# Patient Record
Sex: MALE | Race: WHITE | Employment: FULL TIME | URBAN - METROPOLITAN AREA
[De-identification: names, ages, dates, MRNs, and addresses within clinical notes are randomized per-mention and may not be internally consistent; named-entity substitution may affect disease eponyms.]

---

## 2022-10-10 ENCOUNTER — OFFICE VISIT (OUTPATIENT)
Dept: ORTHOPEDIC SURGERY | Age: 27
End: 2022-10-10
Payer: COMMERCIAL

## 2022-10-10 ENCOUNTER — HOSPITAL ENCOUNTER (OUTPATIENT)
Dept: GENERAL RADIOLOGY | Age: 27
Discharge: HOME | End: 2022-10-12
Payer: COMMERCIAL

## 2022-10-10 DIAGNOSIS — M25.562 ACUTE PAIN OF LEFT KNEE: ICD-10-CM

## 2022-10-10 DIAGNOSIS — M76.32 IT BAND SYNDROME, LEFT: Primary | ICD-10-CM

## 2022-10-10 DIAGNOSIS — M25.562 LEFT KNEE PAIN, UNSPECIFIED CHRONICITY: ICD-10-CM

## 2022-10-10 PROCEDURE — 99203 OFFICE O/P NEW LOW 30 MIN: CPT | Performed by: STUDENT IN AN ORGANIZED HEALTH CARE EDUCATION/TRAINING PROGRAM

## 2022-10-10 PROCEDURE — 73564 X-RAY EXAM KNEE 4 OR MORE: CPT | Performed by: STUDENT IN AN ORGANIZED HEALTH CARE EDUCATION/TRAINING PROGRAM

## 2022-10-10 NOTE — PROGRESS NOTES
Name: Joey Chowdhury  YOB: 1995  Gender: male  MRN: [id number]  Date of Encounter:  10/10/2022       CHIEF COMPLAINT:     Chief Complaint   Patient presents with    Knee Pain        SUBJECTIVE/OBJECTIVE:      HPI:    Patient is a 24 y.o. pleasant male who presents today for a new evaluation of his LEFT knee. Date of injury / symptom onset: around 3 weeks    He is a former , has been running more frequently the past two years. He is currently training for a marathon, expected to run 11/12. This would be his second marathon. Around 3 weeks ago he was on a longer run, expected to run 16 miles, and began to have left lateral knee pain about mile 7. He pushed through to mile 11, stopped for water, then felt sharp pain in the knee and went home. He since has decreased his mileage, has been rolling, stretching more, and he changed his footwear. He ran a trail half marathon this past weekend and didn't have very much pain or flare up of his knee pain. He denies swelling, instability, or painful mechanical symptoms. PAST HISTORY:   Past medical, surgical, family, social history and allergies reviewed by me. Pertinent history:   Tobacco use:  has no history on file for tobacco use. Diabetes: none  CKD: no  Anticoagulation: no      REVIEW OF SYSTEMS:   As noted in HPI. PHYSICAL EXAMINATION:     Gen: Well-developed, no acute distress   HEENT: NC/AT, EOMI   Neck: Trachea midline, normal ROM   CV: Regular rhythm by palpation of distal pulse, normal capillary refill   Pulm: No respiratory distress, no stridor   Psychiatric: Well oriented, normal mood and affect. Skin: No rashes, lesions or ulcers, normal temperature, turgor, and texture on uninvolved extremity.       ORTHO EXAM:     Left KNEE:     Alignment: normal  Inspection: No deformity, No edema, No ecchymosis  Palpation: Effusion  none; Crepitus Negative, Patellar mobility normal  ROM: 140 flexion, 0 extension   Tenderness: Distal IT band. No tenderness at [facility] tubercle. Provocative testing: (-) Reji lateral, Reji medial , Anterior drawer, Posterior drawer, Valgus stress laxity at 0, 30, degrees, Varus stress laxity at 0, 30, degrees. [place] test causes some pain / pulling at proximal IT band. Strength: Extensor mechanism intact  Sensation: intact to light touch   Capillary refill normal    Gait: Normal. Symmetrical gait / light jog. Neutral foot. DIAGNOSTIC IMAGING:     X-ray LEFT knee 4 vw AP / lateral / Areatha Marts / sunrise for knee pain    Findings: No soft tissue swelling. No effusion noted. No acute fracture or dislocation. No degenerative changes are present. Impression: Normal 4 view of knee. I independently interpreted XR taken today    ASSESSMENT/PLAN:   1. It band syndrome, left    2. Acute pain of left knee       Patient exhibits pain over proximal and distal IT band. No instability, no effusion on exam, normal XR. He has no focal bony tenderness suggestive of stress injury. Referral to PT placed to assist patient in rehabilitation in hopes that he may be able to advance his activity and safely run his marathon. He was advised to slowly advance mileage in conjunction with therapy, but use pain as a guide. NSAIDs, icing PRN advised. Follow up in 4 weeks. Orders / medications today:   Orders Placed This Encounter   Procedures    XR KNEE LEFT (MIN 4 VIEWS)     Standing: AP, Lateral, Sunrise, and Skiers     Standing Status:   Future     Number of Occurrences:   1     Standing Expiration Date:   10/10/2023    Ambulatory referral to Physical Therapy     Referral Priority:   Routine     Referral Type:   Eval and Treat     Referral Reason:   Patient Preference     Referred to Provider:   Ralf Randhawa PT     Number of Visits Requested:   1      Follow up: Return in about 4 weeks (around 11/7/2022). The patient expressed understanding and agreed with the plan.      Kye Redd MD   Orthopaedics and [address] [facility],Third Floor     This document was created using voice recognition software so frequent mistakes are possible. For any concerns about the wording of this document, please contact its creator for further clarification.

## 2022-10-12 ENCOUNTER — OFFICE VISIT (OUTPATIENT)
Dept: ORTHOPEDIC SURGERY | Age: 27
End: 2022-10-12
Payer: COMMERCIAL

## 2022-10-12 DIAGNOSIS — M25.562 ACUTE PAIN OF LEFT KNEE: Primary | ICD-10-CM

## 2022-10-12 DIAGNOSIS — Z78.9 ALTERATION IN PERFORMANCE OF ACTIVITIES OF DAILY LIVING: ICD-10-CM

## 2022-10-12 PROCEDURE — 97140 MANUAL THERAPY 1/> REGIONS: CPT | Performed by: PHYSICAL THERAPIST

## 2022-10-12 PROCEDURE — 97110 THERAPEUTIC EXERCISES: CPT | Performed by: PHYSICAL THERAPIST

## 2022-10-12 PROCEDURE — 97162 PT EVAL MOD COMPLEX 30 MIN: CPT | Performed by: PHYSICAL THERAPIST

## 2022-10-12 PROCEDURE — 20560 NDL INSJ W/O NJX 1 OR 2 MUSC: CPT | Performed by: PHYSICAL THERAPIST

## 2022-10-12 NOTE — PROGRESS NOTES
[address] [facility]  [address] [id number]  Dept: [phone]      Physical Therapy Initial Assessment     Referring MD: Zahira Varela MD  Diagnosis:     ICD-10-CM    1. Acute pain of left knee  M25.562       2. Alteration in performance of activities of daily living  Z78.9          Therapy precautions:None  Co-morbidities affecting plan of care: PMH significant for right patellar tendinitis in high school    Total Timed Procedure Codes: 25 min, Total Time: 60 min  Time In: 08:05 AM  Time Out: 09:05 AM    PERTINENT MEDICAL HISTORY     Past medical and surgical history:   No past medical history on file. No past surgical history on file. Medications: reviewed in chart   Allergies: Not on File     SUBJECTIVE     Chief complaints/history of injury: Patient is a 24 y.o. male with a PMH complicated as noted above. He presents to PT with c/o left knee pain. Date symptoms began: ~3-4 weeks ago  [place]oper [place] of condition: Recent onset (initial onset within last 3 months)  Primary cause of current episode: Repetitive  How did symptoms start: Patient is a retired  who has transitioned into distance running. He is currently training for marathon and has run one in the past.  States about 3-4 weeks ago he was doing his long run and felt tightness in his lateral left knee that progressed to sharp burning pain at mile 7. States he continued to run and stopped at mile 11 to refill his water bottle. States he we restarted his run he felt stabbing pain and discontinued the run. States he rested for about 5 days however continued to struggle once he resumed running. States he bought new running shoes a specialty shoe store and ran a 5K without issues. Describe current symptoms: He ran a trial 1/2 marathon with intermittent moderate pain when going downhill, and he ran 6 miles 3 days ago and it felt \"okay\".   States he generally feels pain at around mile 3 and the pain lingers for about an hour after cessation of run. Reports no pain with general MRALDs. Received previous therapy? No    Diagnostic exams (per chart review): per 10/10/2022 MD note  X-ray LEFT knee 4 vw AP / lateral / Amena Said / sunrise for knee pain     Findings: No soft tissue swelling. No effusion noted. No acute fracture or dislocation. No degenerative changes are present. Impression: Normal 4 view of knee. Pain Assessment:    Pain Characteristics   Intensity: 0-7/10   Location: Lateral left knee   Description: Tight   How often do you feel symptoms: Intermittently (0-25%)   Aggravating factors: Jogging, uphill and downhill jogging   Alleviating factors: Changed footwear, Advil,        Neuro screen: denies numbness, tingling, and radiating pain    Social/Functional Hx: How would you rate your overall health? very good  Pt lives with significant other in a(n) 1 story house with entry steps. Current DME: none  Work Status: Employed full time: [employer]   Sleep: normal  PLOF & Social Hx/Interests: Independent and active without physical limitations, Independent community ambulator, Independent household ambulator, Independent with all ADLs, drove independently, and participated in training for marathon  Current level of function:  Independent    Patient Stated Goals:   Train effectively for marathon  Run marathon      OBJECTIVE EXAMINATION     Functional Outcome Questionnaire: Lower Extremity Functional Scale: 62/80 = 78% Function   Observation:   Posture:  Foot Pronation bilaterally, however controlled with inserts  Swelling/Edema: none  Skin Integrity: normal   Palpation:   Gluteus minimus - Palpable tenderness elicited  Biceps Femoris - No palpable tenderness  Semitendinosus - Palpable tenderness elicited distally  Vastus Lateralis - Palpable tenderness elicited in sidelying distally    Patella Mobility: No hypomobility or hypermobility       A/PROM: RIGHT LEFT  Quality of Movement   Knee: WNL WNL    Ankle DF: WFL WFL    HIP: [facility] WF         MMT: RIGHT LEFT Quality of Testing   HIP FLEX: 5/5 5/5    HIP ABD: 5/5 5/5    HIP EXT: 4+/5 4+/5    KNEE EXT: 5/5 4+/5    KNEE FLEX: 5/5 4+/5    DF: 5/5 5/5    PF: 5/5 5/5      Special Tests/Function:   Gait:   Assistive Device None   Initial Contact Heel Strike   Mid-stance Pronated   Terminal Stance Early Heel Off   Swing Phase Passes stance leg   Gait Speed WNL   Quality Non-antalgic      Stair Management:  Ascending  No HR and Reciprocal   Descending  No HR and Reciprocal      Squat Assessment:  Depth 50%   Dynamic Knee Valgus Present   Weight Shift Excessive Right and Excessive Anterior   Trunk Alignment Upright   Presence of Heel Raise Not present            Treatment provided today consisted of initial evaluation followed by: Therapeutic exercise (58659) x 15 min to address ROM/strength deficits and to develop an initial HEP as noted below. Access Code: [email or web address]  URL: [email or web address]  Date: 10/12/2022  Prepared by: Rosaura Dalton    Exercises  Prone Quadriceps Stretch with Strap - 2 x daily - 7 x weekly - 3 sets - 30 seconds hold  Hooklying Hamstring Stretch with Strap - 2 x daily - 7 x weekly - 3 sets - 30 seconds hold  Supine ITB Stretch with Strap - 2 x daily - 7 x weekly - 3 sets - 30 seconds hold  Hip Adductors and Hamstring Stretch with Strap - 2 x daily - 7 x weekly - 3 sets - 30 seconds hold  Hamstring Mobilization with Foam Roll - 2 x daily - 7 x weekly      Patient Education on the condition/pathology, involved anatomy, exercise rationale, cryotherapy, and thermotherapy. Patient was issued a written copy of his HEP which he demonstrated with good form and technique. Tactile and verbal cuing provided to facilitate proper form and technique.      Dry Needling (un-timed code) 19429: needle insertion(s) without injection(s); 1 or 2 muscle(s): Stimulating underlying myofascial trigger points, muscular and connective tissues for the management of neuromusculoskeletal pain and movement impairment   Patient was educated on dry needling treatment, expectations, and post-treatment instructions. Dry needling precautions/contraindications: Reviewed- none noted    Needles size and location: 50 mm  Right vastus lateralis and semitendinosus                                             Needle count: 8 needles inserted/ 8 needles removed   Position: Prone and left sidelying   Needle  technique left in situ x 5 minutes    Electrical Stimulation Not performed   Patient Response: Patient experienced no adverse response to treatment. 4.   Manual therapy (81422) x 10 min utilizing techniques to improve joint and/or soft tissue mobility, ROM, and function as well as helping to decrease pain/spasms and swelling. Palpation and assessment of soft tissue, muscles, and landmarks   STM/IASTM to right semitendinosus, ITB, and vastus lateralis      Plan for Next Session: Assess patient's tolerance to treatment and  assess Modesto's, Darlene Needy, and CHRISTINA tests  and work on hip mobility exercises if indicated. Also continue dry needling and add gluteus minimus treatment if indicated. CLINICAL DECISION MAKING/ASSESSMENT     Personal Factors/co-morbidities affecting POC (1-2 Medium/3+High): no factors involved   Problem List: (1-2 Low/ 3 Medium/ 4+ High) Pain  Soft tissue restrictions  Decreased endurance/activity Tolerance  Restricted recreational participation    Clinical decision making: moderate complexity with questionable prediction of expectations and future outcomes which may require adjustments to the POC. Prognosis: good   Benefits and precautions of treatment explained to patient. Judy Chaves is a 24 y.o. male who presents to therapy today with evolving/changing clinical presentation (moderate complexity)  related to ITB syndrome.   Pt would benefit from skilled physical therapy services to address the deficits noted above for return to prior level of function. PLAN OF CARE     Effective Dates: 10/12/2022 TO 11/11/2022 (30 days). Frequency/Duration: 2x/week for 30 Day(s)  Interventions may include but are not limited to: (86041) Therapeutic exercise to develop ROM, strength, endurance and flexibility  (59934) Therapeutic activities using dynamic activities to improve function  (68389) Manual therapy techniques to improve joint and/or soft tissue mobility, ROM, and function as well as helping to decrease pain/spasms and swelling  (62622) Neuromuscular reeducation addressing impaired balance, coordination, kinesthetic sense, posture and proprioception  (16621/69510) Dry needling for the management of neuromusculoskeletal pain and movement impairment  Home exercise program (HEP) development    The referring physician has reviewed and approved this evaluation and plan of care as noted by the electronic signature attached to note. GOALS     Short term goals to be met by 10/26/2022 (2 weeks)  Patient will demonstrate good recall of HEP requiring minimal verbal cuing for proper form and technique. Pt. will be able to modify activities in order keep pain to minimal levels (? 4/10) with ADLs. Patient will achieve an average score on the Patient-Specific Functional Scale ? 5/10 indicating improving functional mobility. Improve LEFS to ? 65/80 showing improved confidence in functional mobility. Long term goals to be met by 11/9/2022  (4 weeks)  Patient will report minimal to no discomfort while jogging. Improve LEFS to ? 70/80, which shows patient having decreased functional deficit related to knee injury. Patient will achieve an average score on the Patient-Specific Functional Scale ? 7/10 indicating improving functional mobility.   Discharged from [facility]

## 2022-10-25 NOTE — PROGRESS NOTES
[address] [facility]  [address] [place] [id number]  Dept: [phone]      Physical Therapy Daily Note     Referring MD: Zoë Rodgers MD  Diagnosis:     ICD-10-CM    1. Alteration in performance of activities of daily living  Z78.9       2. Acute pain of left knee  M25.562          Therapy precautions:None  Co-morbidities affecting plan of care: PMH significant for right patellar tendinitis in high school    Total Timed Procedure Codes: 40 min, Total Time: 50 min  Time In: 08:05 AM  Time Out: 08:55 AM    PERTINENT MEDICAL HISTORY     Past medical and surgical history:   No past medical history on file. No past surgical history on file. Medications: reviewed in chart   Allergies: Not on File       Chief complaints/history of injury: Patient is a 24 y.o. male with a PMH complicated as noted above. He presents to PT with c/o left knee pain. Date symptoms began: ~3-4 weeks ago  Lorenza Yovani of condition: Recent onset (initial onset within last 3 months)  Primary cause of current episode: Repetitive  How did symptoms start: Patient is a retired  who has transitioned into distance running. He is currently training for marathon and has run one in the past.  States about 3-4 weeks ago he was doing his long run and felt tightness in his lateral left knee that progressed to sharp burning pain at mile 7. States he continued to run and stopped at mile 11 to refill his water bottle. States he we restarted his run he felt stabbing pain and discontinued the run. States he rested for about 5 days however continued to struggle once he resumed running. States he bought new running shoes a specialty shoe store and ran a 5K without issues. Describe current symptoms: He ran a trial 1/2 marathon with intermittent moderate pain when going downhill, and he ran 6 miles 3 days ago and it felt \"okay\".   States he generally feels pain at around mile 3 and the pain lingers for about an hour after cessation of run. Reports no pain with general MRALDs. Received previous therapy? No    Diagnostic exams (per chart review): per 10/10/2022 MD note  X-ray LEFT knee 4 vw AP / lateral / Elmer Keita / renate for knee pain     Findings: No soft tissue swelling. No effusion noted. No acute fracture or dislocation. No degenerative changes are present. Impression: Normal 4 view of knee. SUBJECTIVE     Patient reports he ran 2 days following his last therapy session and experienced left knee pain, however when he ran a day later he felt okay. States at his last run was a 5K and he experienced no knee pain while running, however tightness afterwards. Pain Assessment:  Pain location: Lateral left knee    Average Pain/symptom intensity (0-10 scale)  Last 24 hours: 2-3/10  Last week (1-7 days): 5/10    Outcome Measure: The Patient-Specific Functional Scale: Activity: 10/26/2022 Comments   1. Train effectively for marathon 5-6/10    2. Run marathon 6/10    Average score:  5.75/10      Interpretation of Score: The Patient-Specific functional scale is used to quantify activity limitation and measure functional outcome for patients with any orthopaedic condition. Each activity is scored 0-10, 0 representing unable to perform activity and 10 able to perform activity at the same level as before injury or problem. Minimum detectable change is 2 points for average score and 3 points for single activity score. OBJECTIVE      Treatment provided today consisted of:  Dry Needling (un-timed code) 01588: needle insertion(s) without injection(s); 1 or 2 muscle(s): Stimulating underlying myofascial trigger points, muscular and connective tissues for the management of neuromusculoskeletal pain and movement impairment   Patient was educated on dry needling treatment, expectations, and post-treatment instructions.     Dry needling precautions/contraindications: Reviewed- none noted    [place] size and location: 50 mm  Right vastus lateralis                                             Needle count: 3 needles inserted/ 3 needles removed   Position: Supine   Needle  technique left in situ x 5 minutes    Electrical Stimulation Performed with (91511/) unattended electrical stimulation   Patient Response: Patient experienced no adverse response to treatment. Manual therapy (80529) x 10 min utilizing techniques to improve joint and/or soft tissue mobility, ROM, and function as well as helping to decrease pain/spasms and swelling. Palpation and assessment of soft tissue, muscles, and landmarks   IASTM to right semitendinosus, ITB, and vastus lateralis    Therapeutic exercise (83429) x 30 min to develop ROM, strength, endurance and flexibility in the LLE. 3-way strap stretches x 30\"   Standing quadriceps stretch x 30\"  Forward step-up to reverse lunge combo, 12\" step x 10 each side  Forward step-up to unilateral deadlift, 15#, 12\" step x 10 each side  March stabilization with contralateral shoulder press, 15#  Marching into stabilization with contralateral shoulder press, 15#      ASSESSMENT   Patient presented with multiple tender points in his vastus lateralis therefore DN treatment focused on that muscle with the addition of electrical stimulation. Good visible muscle contraction achieved with EDN as well as good erythema achieved with IASTM. Patient was able to initiate therapeutic exercise today with exercises focused on cross-body core stabilization. GOALS     Short term goals to be met by 10/26/2022 (2 weeks)  Patient will demonstrate good recall of HEP requiring minimal verbal cuing for proper form and technique. - MET: 10/26/22    Pt. will be able to modify activities in order keep pain to minimal levels (? 4/10) with ADLs. - PROGRESSING: 10/26/22   Patient will achieve an average score on the Patient-Specific Functional Scale ?  5/10 indicating improving functional mobility.- MET: 10/26/22   Improve LEFS to ? 65/80 showing improved confidence in functional mobility.- Assess at next therapy session: 10/26/22     Long term goals to be met by 11/9/2022  (4 weeks)  Patient will report minimal to no discomfort while jogging. Improve LEFS to ? 70/80, which shows patient having decreased functional deficit related to knee injury. Patient will achieve an average score on the Patient-Specific Functional Scale ? 7/10 indicating improving functional mobility. Discharged from PT. PLAN    Consider adding gluteus minimus to DN treatment, continue cross-body exercises, look at hip mobility exercises (scorpions and iron cross)     Access Code: [email or web address]  URL: [email or web address]  Date: 10/12/2022  Prepared by: Margy Knox.  DPT    Exercises  Prone Quadriceps Stretch with Strap - 2 x daily - 7 x weekly - 3 sets - 30 seconds hold  Hooklying Hamstring Stretch with Strap - 2 x daily - 7 x weekly - 3 sets - 30 seconds hold  Supine ITB Stretch with Strap - 2 x daily - 7 x weekly - 3 sets - 30 seconds hold  Hip Adductors and Hamstring Stretch with Strap - 2 x daily - 7 x weekly - 3 sets - 30 seconds hold  Hamstring Mobilization with Foam Roll - 2 x daily - 7 x weekly       [employer]

## 2022-10-26 ENCOUNTER — OFFICE VISIT (OUTPATIENT)
Dept: ORTHOPEDIC SURGERY | Age: 27
End: 2022-10-26
Payer: COMMERCIAL

## 2022-10-26 DIAGNOSIS — Z78.9 ALTERATION IN PERFORMANCE OF ACTIVITIES OF DAILY LIVING: Primary | ICD-10-CM

## 2022-10-26 DIAGNOSIS — M25.562 ACUTE PAIN OF LEFT KNEE: ICD-10-CM

## 2022-10-26 PROCEDURE — 20560 NDL INSJ W/O NJX 1 OR 2 MUSC: CPT | Performed by: PHYSICAL THERAPIST

## 2022-10-26 PROCEDURE — 97140 MANUAL THERAPY 1/> REGIONS: CPT | Performed by: PHYSICAL THERAPIST

## 2022-10-26 PROCEDURE — 97110 THERAPEUTIC EXERCISES: CPT | Performed by: PHYSICAL THERAPIST

## 2022-10-28 ENCOUNTER — OFFICE VISIT (OUTPATIENT)
Dept: ORTHOPEDIC SURGERY | Age: 27
End: 2022-10-28
Payer: COMMERCIAL

## 2022-10-28 DIAGNOSIS — Z78.9 ALTERATION IN PERFORMANCE OF ACTIVITIES OF DAILY LIVING: Primary | ICD-10-CM

## 2022-10-28 DIAGNOSIS — M25.562 ACUTE PAIN OF LEFT KNEE: ICD-10-CM

## 2022-10-28 PROCEDURE — 97530 THERAPEUTIC ACTIVITIES: CPT | Performed by: PHYSICAL THERAPIST

## 2022-10-28 PROCEDURE — 97110 THERAPEUTIC EXERCISES: CPT | Performed by: PHYSICAL THERAPIST

## 2022-10-28 NOTE — PROGRESS NOTES
[address] [facility]  [address] [place] [id number]  Dept: [phone]      Physical Therapy Daily Note     Referring MD: Jose Morillo MD  Diagnosis:     ICD-10-CM    1. Alteration in performance of activities of daily living  Z78.9       2. Acute pain of left knee  M25.562          Therapy precautions:None  Co-morbidities affecting plan of care: PMH significant for right patellar tendinitis in high school    Total Timed Procedure Codes: 40 min, Total Time: 40 min  Time In: 01:45 PM  Time Out: 02:25 PM    PERTINENT MEDICAL HISTORY     Past medical and surgical history:   No past medical history on file. No past surgical history on file. Medications: reviewed in chart   Allergies: Not on File       Chief complaints/history of injury: Patient is a 24 y.o. male with a PMH complicated as noted above. He presents to PT with c/o left knee pain. Date symptoms began: ~3-4 weeks ago  Татьяна Dueñas of condition: Recent onset (initial onset within last 3 months)  Primary cause of current episode: Repetitive  How did symptoms start: Patient is a retired  who has transitioned into distance running. He is currently training for marathon and has run one in the past.  States about 3-4 weeks ago he was doing his long run and felt tightness in his lateral left knee that progressed to sharp burning pain at mile 7. States he continued to run and stopped at mile 11 to refill his water bottle. States he we restarted his run he felt stabbing pain and discontinued the run. States he rested for about 5 days however continued to struggle once he resumed running. States he bought new running shoes a specialty shoe store and ran a 5K without issues. Describe current symptoms: He ran a trial 1/2 marathon with intermittent moderate pain when going downhill, and he ran 6 miles 3 days ago and it felt \"okay\".   States he generally feels pain at around mile 3 and the pain lingers for about an hour after cessation of run. Reports no pain with general MRALDs. Received previous therapy? No    Diagnostic exams (per chart review): per 10/10/2022 MD note  X-ray LEFT knee 4 vw AP / lateral / Theoplis Hurl / sunrise for knee pain     Findings: No soft tissue swelling. No effusion noted. No acute fracture or dislocation. No degenerative changes are present. Impression: Normal 4 view of knee. SUBJECTIVE     Patient reports he ran 4 miles yesterday without pain and plans to run a marathon tomorrow. States he also felt good following his last therapy session. OBJECTIVE      Treatment provided today consisted of: Therapeutic exercise (91050) x 15 min to develop ROM, strength, endurance and flexibility in the LLE. Elliptical x 8', 20\" sprint, 1'    3-way strap stretches x 30\"   Standing quadriceps stretch x 30\"  Squat mobility series x 10 each    Therapeutic activities (97274) x 25 min using dynamic activities to improve function related to return to distance jogging. Standing toe touches with heels on 1/2 foam x 15  Standing toe touches with forefeet on 1/2 foam x 15  Iron Cross x 15  Scorpions x 15  Forward/Backwards lunges with contralateral overhead press, 10# - 3x5 (bilateral)        ASSESSMENT   No dry needling performed today d/t patient planning to run a marathon tomorrow. Tolerated treatment today without exacerbation of concordant symptoms. GOALS     Short term goals to be met by 10/26/2022 (2 weeks)  . - MET: 10/26/22    Pt. will be able to modify activities in order keep pain to minimal levels (? 4/10) with ADLs. - PROGRESSING: 10/26/22   Patient will achieve an average score on the Patient-Specific Functional Scale ?  5/10 indicating improving functional mobility.- MET: 10/26/22   Improve LEFS to ? 65/80 showing improved confidence in functional mobility.- Assess at next therapy session: 10/26/22     Long term goals to be met by 11/9/2022  (4 weeks)  Patient will report minimal to no discomfort while jogging. Improve LEFS to ? 70/80, which shows patient having decreased functional deficit related to knee injury. Patient will achieve an average score on the Patient-Specific Functional Scale ? 7/10 indicating improving functional mobility. Discharged from PT. PLAN    continue cross-body exercises, look at hip mobility exercises (scorpions and iron cross)     Access Code: [email or web address]  URL: [email or web address]  Date: 10/12/2022  Prepared by: Brianna Culver.  DPT    Exercises  Prone Quadriceps Stretch with Strap - 2 x daily - 7 x weekly - 3 sets - 30 seconds hold  Hooklying Hamstring Stretch with Strap - 2 x daily - 7 x weekly - 3 sets - 30 seconds hold  Supine ITB Stretch with Strap - 2 x daily - 7 x weekly - 3 sets - 30 seconds hold  Hip Adductors and Hamstring Stretch with Strap - 2 x daily - 7 x weekly - 3 sets - 30 seconds hold  Hamstring Mobilization with Foam Roll - 2 x daily - 7 x weekly       [facility]

## 2022-11-02 ENCOUNTER — OFFICE VISIT (OUTPATIENT)
Dept: ORTHOPEDIC SURGERY | Age: 27
End: 2022-11-02
Payer: COMMERCIAL

## 2022-11-02 DIAGNOSIS — M25.562 ACUTE PAIN OF LEFT KNEE: ICD-10-CM

## 2022-11-02 DIAGNOSIS — M76.32 IT BAND SYNDROME, LEFT: ICD-10-CM

## 2022-11-02 DIAGNOSIS — Z78.9 ALTERATION IN PERFORMANCE OF ACTIVITIES OF DAILY LIVING: Primary | ICD-10-CM

## 2022-11-02 PROCEDURE — 20561 NDL INSJ W/O NJX 3+ MUSC: CPT | Performed by: PHYSICAL THERAPIST

## 2022-11-02 PROCEDURE — 97140 MANUAL THERAPY 1/> REGIONS: CPT | Performed by: PHYSICAL THERAPIST

## 2022-11-02 PROCEDURE — 97110 THERAPEUTIC EXERCISES: CPT | Performed by: PHYSICAL THERAPIST

## 2022-11-02 NOTE — PROGRESS NOTES
[address] [facility]  [address] [place] [id number]  Dept: [phone]      Physical Therapy Daily Note     Referring MD: Camille Jolley MD  Diagnosis:     ICD-10-CM    1. Alteration in performance of activities of daily living  Z78.9       2. Acute pain of left knee  M25.562       3. It band syndrome, left  M76.32          Therapy precautions:None  Co-morbidities affecting plan of care: PMH significant for right patellar tendinitis in high school    Total Timed Procedure Codes: 33 min, Total Time: 53 min  Time In: 08:02 AM  Time Out: 08:55 AM    PERTINENT MEDICAL HISTORY     Past medical and surgical history:   No past medical history on file. No past surgical history on file. Medications: reviewed in chart   Allergies: Not on File       Chief complaints/history of injury: Patient is a 24 y.o. male with a PMH complicated as noted above. He presents to PT with c/o left knee pain. Date symptoms began: ~3-4 weeks ago  Ignacio Bar of condition: Recent onset (initial onset within last 3 months)  Primary cause of current episode: Repetitive  How did symptoms start: Patient is a retired  who has transitioned into distance running. He is currently training for marathon and has run one in the past.  States about 3-4 weeks ago he was doing his long run and felt tightness in his lateral left knee that progressed to sharp burning pain at mile 7. States he continued to run and stopped at mile 11 to refill his water bottle. States he we restarted his run he felt stabbing pain and discontinued the run. States he rested for about 5 days however continued to struggle once he resumed running. States he bought new running shoes a specialty shoe store and ran a 5K without issues. Describe current symptoms: He ran a trial 1/2 marathon with intermittent moderate pain when going downhill, and he ran 6 miles 3 days ago and it felt \"okay\".   States he generally feels pain at around mile 3 and the pain lingers for about an hour after cessation of run. Reports no pain with general MRALDs. Received previous therapy? No    Diagnostic exams (per chart review): per 10/10/2022 MD note  X-ray LEFT knee 4 vw AP / lateral / Mike Hard / sunrise for knee pain     Findings: No soft tissue swelling. No effusion noted. No acute fracture or dislocation. No degenerative changes are present. Impression: Normal 4 view of knee. SUBJECTIVE     Patient reports at around mile 8 of the 1/2 marathon he felt tightness in his lateral left knee that bothered him for about 1.5 miles. States he walked a couple of times however managed to [place] at the end. Reports soreness for a couple of days afterwards however felt \"good\" towards the end of the race. OBJECTIVE      Treatment provided today consisted of:    Dry Needling (un-timed code) 25458: needle insertion(s) without injection(s); 3 or more muscles:  Stimulating underlying myofascial trigger points, muscular and connective tissues for the management of neuromusculoskeletal pain and movement impairment   Patient was educated on dry needling treatment, expectations, and post-treatment instructions. Dry needling precautions/contraindications: Reviewed- none noted     Needles size and location: 50 mm  Right vastus lateralis right gluteus minimus and medius                                             Needle count: 10 needles inserted/ 10 needles removed   Position: Supine   Needle  technique left in situ x 5 minutes for each   Electrical Stimulation Performed with (24816/) unattended electrical stimulation   Patient Response: Patient experienced no adverse response to treatment. Manual therapy (91051) x 10 min utilizing techniques to improve joint and/or soft tissue mobility, ROM, and function as well as helping to decrease pain/spasms and swelling.   Palpation and assessment of soft tissue, muscles, and landmarks   IASTM to right gluteus medius and vastus lateralis      Therapeutic exercise (84167) x 23 min to develop ROM, strength, endurance and flexibility in the LLE. Elliptical x 8', 20\" sprint, 1'    Piriformis stretch x 30\"  Advanced quadriceps stretch x 30\"  Prone hip flexor stretch x 30\"        ASSESSMENT   Patient reported pressure and muscle cramping in his right gluteus medius during dry needling treatment. Also, notable soft tissue resistance present during insertion of the needles into the vastus lateralis. Good erythema achieved with manual therapy. GOALS     Short term goals to be met by 10/26/2022 (2 weeks)  . - MET: 10/26/22    Pt. will be able to modify activities in order keep pain to minimal levels (? 4/10) with ADLs. - PROGRESSING: 10/26/22   Patient will achieve an average score on the Patient-Specific Functional Scale ? 5/10 indicating improving functional mobility.- MET: 10/26/22   Improve LEFS to ? 65/80 showing improved confidence in functional mobility.- Assess at next therapy session: 10/26/22     Long term goals to be met by 11/9/2022  (4 weeks)  Patient will report minimal to no discomfort while jogging. Improve LEFS to ? 70/80, which shows patient having decreased functional deficit related to knee injury. Patient will achieve an average score on the Patient-Specific Functional Scale ? 7/10 indicating improving functional mobility. Discharged from PT. PLAN    Assess patient's tolerance to treatment and continue cross-body exercises and hip mobility exercises (Tall kneeling lean backs, Tall kneeling hip hinge)    Access Code: [email or web address]  URL: [email or web address]  Date: 10/12/2022  Prepared by: Sonia Prado.  DPT    Exercises  Prone Quadriceps Stretch with Strap - 2 x daily - 7 x weekly - 3 sets - 30 seconds hold  Hooklying Hamstring Stretch with Strap - 2 x daily - 7 x weekly - 3 sets - 30 seconds hold  Supine ITB Stretch with Strap - 2 x daily - 7 x weekly - 3 sets - 30 seconds hold  Hip Adductors and Hamstring Stretch with Strap - 2 x daily - 7 x weekly - 3 sets - 30 seconds hold  Hamstring Mobilization with Foam Roll - 2 x daily - 7 x weekly       [facility]

## 2022-11-04 ENCOUNTER — OFFICE VISIT (OUTPATIENT)
Dept: ORTHOPEDIC SURGERY | Age: 27
End: 2022-11-04

## 2022-11-04 DIAGNOSIS — M76.32 IT BAND SYNDROME, LEFT: ICD-10-CM

## 2022-11-04 DIAGNOSIS — M25.562 ACUTE PAIN OF LEFT KNEE: Primary | ICD-10-CM

## 2022-11-04 NOTE — PROGRESS NOTES
[address] [facility]  [address] [place] [id number]  Dept: [phone]      Physical Therapy Daily Note     Referring MD: Jessy Moreland MD  Diagnosis:     ICD-10-CM    1. Acute pain of left knee  M25.562       2. It band syndrome, left  M76.32          Therapy precautions:None  Co-morbidities affecting plan of care: PMH significant for right patellar tendinitis in high school    Total Timed Procedure Codes: 53 min, Total Time: 53 min      PERTINENT MEDICAL HISTORY     Past medical and surgical history:   No past medical history on file. No past surgical history on file. Medications: reviewed in chart   Allergies: Not on File       Chief complaints/history of injury: Patient is a 24 y.o. male with a PMH complicated as noted above. He presents to PT with c/o left knee pain. Date symptoms began: ~3-4 weeks ago  Ade Pal of condition: Recent onset (initial onset within last 3 months)  Primary cause of current episode: Repetitive  How did symptoms start: Patient is a retired  who has transitioned into distance running. He is currently training for marathon and has run one in the past.  States about 3-4 weeks ago he was doing his long run and felt tightness in his lateral left knee that progressed to sharp burning pain at mile 7. States he continued to run and stopped at mile 11 to refill his water bottle. States he we restarted his run he felt stabbing pain and discontinued the run. States he rested for about 5 days however continued to struggle once he resumed running. States he bought new running shoes a specialty shoe store and ran a 5K without issues. Describe current symptoms: He ran a trial 1/2 marathon with intermittent moderate pain when going downhill, and he ran 6 miles 3 days ago and it felt \"okay\". States he generally feels pain at around mile 3 and the pain lingers for about an hour after cessation of run.   Reports no pain with general MRALDs. Received previous therapy? No    Diagnostic exams (per chart review): per 10/10/2022 MD note  X-ray LEFT knee 4 vw AP / lateral / Zena Asael / sunrise for knee pain     Findings: No soft tissue swelling. No effusion noted. No acute fracture or dislocation. No degenerative changes are present. Impression: Normal 4 view of knee. SUBJECTIVE     Pt reports his knee has been feeling better. He has plans to go on a long run and then taper until his race next weekend      OBJECTIVE      Treatment provided today consisted of:    Dry Needling (un-timed code) 35851: needle insertion(s) without injection(s); 1-2 muscles:  Stimulating underlying myofascial trigger points, muscular and connective tissues for the management of neuromusculoskeletal pain and movement impairment   Patient was educated on dry needling treatment, expectations, and post-treatment instructions. Dry needling precautions/contraindications: Reviewed- none noted     Needles size and location: 60 mm  Right vastus lateralis right ITB                                             Needle count: 6 needles inserted/ 6 needles removed   Position: Supine   Needle  technique left in situ x 5 minutes for each   Electrical Stimulation  none   Patient Response: Patient experienced no adverse response to treatment. Manual therapy (10485) x 8 min utilizing techniques to improve joint and/or soft tissue mobility, ROM, and function as well as helping to decrease pain/spasms and swelling. Palpation and assessment of soft tissue, muscles, and landmarks   STM distal R ITB and VL      Therapeutic exercise (77951) x 45 min to develop ROM, strength, endurance and flexibility in the LLE.   Elliptical x 5', 20\" sprint, 1'    SL bridge 20x5s  Sidestepping 2x3 GTB (25' laps)  Bwd monster walk 2x3 laps GTB (25_  SL RDL 20# 30x  Side plank w/ hip ABD 2x30s  Lateral step down 6\" 3x12 to hip ABD at top 3x12        ASSESSMENT   Pt has good katerine of strength progression emphasis w/ tx today. He is challenged w/ glute med and core exercise but did have a positive response to DN w/ dec TTP and symptom c/o in distal knee w/ stair activity        GOALS     Short term goals to be met by 10/26/2022 (2 weeks)  . - MET: 10/26/22    Pt. will be able to modify activities in order keep pain to minimal levels (? 4/10) with ADLs. - PROGRESSING: 10/26/22   Patient will achieve an average score on the Patient-Specific Functional Scale ? 5/10 indicating improving functional mobility.- MET: 10/26/22   Improve LEFS to ? 65/80 showing improved confidence in functional mobility.- Assess at next therapy session: 10/26/22     Long term goals to be met by 11/9/2022  (4 weeks)  Patient will report minimal to no discomfort while jogging. Improve LEFS to ? 70/80, which shows patient having decreased functional deficit related to knee injury. Patient will achieve an average score on the Patient-Specific Functional Scale ? 7/10 indicating improving functional mobility. Discharged from PT. PLAN    Cont w/ progressive loading; look to add plyo training    Access Code: [email or web address] Ada: [email or web address]  Date: 10/12/2022  Prepared by: Ian Soto.  DPT    Exercises  Prone Quadriceps Stretch with Strap - 2 x daily - 7 x weekly - 3 sets - 30 seconds hold  Hooklying Hamstring Stretch with Strap - 2 x daily - 7 x weekly - 3 sets - 30 seconds hold  Supine ITB Stretch with Strap - 2 x daily - 7 x weekly - 3 sets - 30 seconds hold  Hip Adductors and Hamstring Stretch with Strap - 2 x daily - 7 x weekly - 3 sets - 30 seconds hold  Hamstring Mobilization with Foam Roll - 2 x daily - 7 x weekly       [facility]

## 2022-11-07 ENCOUNTER — OFFICE VISIT (OUTPATIENT)
Dept: ORTHOPEDIC SURGERY | Age: 27
End: 2022-11-07
Payer: COMMERCIAL

## 2022-11-07 DIAGNOSIS — M76.32 IT BAND SYNDROME, LEFT: Primary | ICD-10-CM

## 2022-11-07 PROCEDURE — 99212 OFFICE O/P EST SF 10 MIN: CPT | Performed by: STUDENT IN AN ORGANIZED HEALTH CARE EDUCATION/TRAINING PROGRAM

## 2022-11-07 NOTE — PROGRESS NOTES
Name: Amrit Cordon  YOB: 1995  Gender: male  MRN: [id number]  Date of Encounter:  11/7/2022       CHIEF COMPLAINT:     Chief Complaint   Patient presents with    Follow-up     Left knee        SUBJECTIVE/OBJECTIVE:      HPI:    Patient is a 24 y.o. pleasant male who presents today for a follow up evaluation of his left knee    Working diagnosis is: The encounter diagnosis was It band syndrome, left. 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 He has had benefit from the activities performed there as well as some dry needling. He ran a half marathon 2 weeks ago at the [employer]. He reports feeling some pain between mile 7-9 but then \"went numb. \" He did have increased pain in the 2 days after this event but that got better. He was having pain earlier in runs before our first visit, so he does feel like he is improving, just not perfect yet. He denies any new symptoms. He has tightness to the lateral distal IT band at times. PAST HISTORY:   Past medical, surgical, family, social history and allergies reviewed by me. Unchanged from prior visit. REVIEW OF SYSTEMS:   As noted in HPI. PHYSICAL EXAMINATION:     Gen: Well-developed, no acute distress   HEENT: NC/AT, EOMI   Neck: Trachea midline, normal ROM   CV: Regular rhythm by palpation of distal pulse, normal capillary refill   Pulm: No respiratory distress, no stridor   Psychiatric: Well oriented, normal mood and affect. Skin: No rashes, lesions or ulcers, normal temperature, turgor, and texture on uninvolved extremity. ORTHO EXAM:     Left knee:      Inspection: No edema, No ecchymosis  Palpation: Effusion  none; Crepitus Negative, Patellar mobility normal  ROM: 140 flexion, 0 extension   Tenderness: No tenderness  Provocative testing: (-) [place], - Reji  Strength: Extensor mechanism intact  Sensation: intact to light touch   Capillary refill normal    Gait: Normal       DIAGNOSTIC IMAGING:     I have reviewed prior imaging studies. ASSESSMENT/PLAN:   1. It band syndrome, left         He reports improvement of his pain. His examination appears improved today. He still has upcoming appointments with PT, he states. We discussed his upcoming run. I advised him that he is likely to have increased pain with such a long run, but I think he is okay to run as long as he does not push through moderate to severe pain. He was advised to that following his marathon, we should take a period of rest and continue her therapy exercises and slow return to running. I would like to reevaluate him after that run, around 2 to 4 weeks. Orders:   No orders of the defined types were placed in this encounter. Follow Up:   Return in about 2 weeks (around 11/21/2022). The patient expressed understanding and agreed with the plan. Prateek Marc MD   [facility]     This document was created using voice recognition software so frequent mistakes are possible. For any concerns about the wording of this document, please contact its creator for further clarification.

## 2022-11-08 ENCOUNTER — OFFICE VISIT (OUTPATIENT)
Dept: ORTHOPEDIC SURGERY | Age: 27
End: 2022-11-08
Payer: COMMERCIAL

## 2022-11-08 DIAGNOSIS — Z78.9 ALTERATION IN PERFORMANCE OF ACTIVITIES OF DAILY LIVING: ICD-10-CM

## 2022-11-08 DIAGNOSIS — M76.32 IT BAND SYNDROME, LEFT: Primary | ICD-10-CM

## 2022-11-08 DIAGNOSIS — M25.562 ACUTE PAIN OF LEFT KNEE: ICD-10-CM

## 2022-11-08 PROCEDURE — 97530 THERAPEUTIC ACTIVITIES: CPT | Performed by: PHYSICAL THERAPIST

## 2022-11-08 PROCEDURE — 20560 NDL INSJ W/O NJX 1 OR 2 MUSC: CPT | Performed by: PHYSICAL THERAPIST

## 2022-11-08 NOTE — PROGRESS NOTES
[facility]  [address] [place] [id number]  Dept: [phone]      Physical Therapy Daily Note     Referring MD: Emmett Sawant MD  Diagnosis:     ICD-10-CM    1. It band syndrome, left  M76.32       2. Acute pain of left knee  M25.562       3. Alteration in performance of activities of daily living  Z78.9          Therapy precautions:None  Co-morbidities affecting plan of care: PMH significant for right patellar tendinitis in high school    Total Timed Procedure Codes: 44 min, Total Time: 54 min  Time In: 01:00 PM  Time Out: 01:54 PM      PERTINENT MEDICAL HISTORY     Past medical and surgical history:   No past medical history on file. No past surgical history on file. Medications: reviewed in chart   Allergies: Not on File       Chief complaints/history of injury: Patient is a 24 y.o. male with a PMH complicated as noted above. He presents to PT with c/o left knee pain. Date symptoms began: ~3-4 weeks ago  Desiree Tuttle of condition: Recent onset (initial onset within last 3 months)  Primary cause of current episode: Repetitive  How did symptoms start: Patient is a retired  who has transitioned into distance running. He is currently training for marathon and has run one in the past.  States about 3-4 weeks ago he was doing his long run and felt tightness in his lateral left knee that progressed to sharp burning pain at mile 7. States he continued to run and stopped at mile 11 to refill his water bottle. States he we restarted his run he felt stabbing pain and discontinued the run. States he rested for about 5 days however continued to struggle once he resumed running. States he bought new running shoes a specialty shoe store and ran a 5K without issues. Describe current symptoms: He ran a trial 1/2 marathon with intermittent moderate pain when going downhill, and he ran 6 miles 3 days ago and it felt \"okay\".   States he generally feels pain at around mile 3 and the pain lingers for about an hour after cessation of run. Reports no pain with general MRALDs. Received previous therapy? No    Diagnostic exams (per chart review): per 10/10/2022 MD note  X-ray LEFT knee 4 vw AP / lateral / Mukesh Hurley / sunrise for knee pain     Findings: No soft tissue swelling. No effusion noted. No acute fracture or dislocation. No degenerative changes are present. Impression: Normal 4 view of knee. SUBJECTIVE     Pt reports he did a 6 mile run with tightness in his lateral knee afterwards. States his marathon is this Saturday and understands his post-run recovery routine will be important and he will experience some discomfort during the run. OBJECTIVE      Treatment provided today consisted of:    Dry Needling (un-timed code) 20856: needle insertion(s) without injection(s); 1-2 muscles:  Stimulating underlying myofascial trigger points, muscular and connective tissues for the management of neuromusculoskeletal pain and movement impairment   Patient was educated on dry needling treatment, expectations, and post-treatment instructions. Dry needling precautions/contraindications: Reviewed- none noted     Needles size and location: 60 mm  Right vastus lateralis right ITB                                             Needle count: 6 needles inserted/ 6 needles removed   Position: SDLY   Needle  technique left in situ x 5 minutes for each   Electrical Stimulation  none   Patient Response: Patient experienced no adverse response to treatment. Manual therapy (19985) x 5 min utilizing techniques to improve joint and/or soft tissue mobility, ROM, and function as well as helping to decrease pain/spasms and swelling. Palpation and assessment of soft tissue, muscles, and landmarks   STM distal R ITB and VL    Therapeutic activities (13997) x 39 min using dynamic activities to improve function related to distance running.   Elliptical x 6'  Jumping jacks with green TB around ankles x 50  Jumping jacks with green TB around ankles and yellow TB around wrists x 50  Forward step-ups into high knee on 18\" step, 2x15#, posterior resistance - 2x15  Lateral step-downs on 18\" step - 2x15  [employer] on sliders x 100        ASSESSMENT   Patient expressed muscular fatigue however no exacerbation of concordant symptoms during treatment today. Demonstration, tactile cuing, and verbal cuing provided to ensure proper performance of exercises. GOALS     Short term goals to be met by 10/26/2022 (2 weeks)  . - MET: 10/26/22    Pt. will be able to modify activities in order keep pain to minimal levels (? 4/10) with ADLs. - PROGRESSING: 10/26/22   Patient will achieve an average score on the Patient-Specific Functional Scale ? 5/10 indicating improving functional mobility.- MET: 10/26/22   Improve LEFS to ? 65/80 showing improved confidence in functional mobility.- Assess at next therapy session: 10/26/22     Long term goals to be met by 11/9/2022  (4 weeks)  Patient will report minimal to no discomfort while jogging. Improve LEFS to ? 70/80, which shows patient having decreased functional deficit related to knee injury. Patient will achieve an average score on the Patient-Specific Functional Scale ? 7/10 indicating improving functional mobility. Discharged from PT. PLAN    Re-eval    Access Code: [email or web address]  URL: [email or web address]  Date: 10/12/2022  Prepared by: Camilo Carlson.  DPT    Exercises  Prone Quadriceps Stretch with Strap - 2 x daily - 7 x weekly - 3 sets - 30 seconds hold  Hooklying Hamstring Stretch with Strap - 2 x daily - 7 x weekly - 3 sets - 30 seconds hold  Supine ITB Stretch with Strap - 2 x daily - 7 x weekly - 3 sets - 30 seconds hold  Hip Adductors and Hamstring Stretch with Strap - 2 x daily - 7 x weekly - 3 sets - 30 seconds hold  Hamstring Mobilization with Foam Roll - 2 x daily - 7 x weekly       [employer]

## 2022-11-10 ENCOUNTER — OFFICE VISIT (OUTPATIENT)
Dept: ORTHOPEDIC SURGERY | Age: 27
End: 2022-11-10

## 2022-11-10 DIAGNOSIS — M76.32 IT BAND SYNDROME, LEFT: Primary | ICD-10-CM

## 2022-11-10 DIAGNOSIS — M25.562 ACUTE PAIN OF LEFT KNEE: ICD-10-CM

## 2022-11-10 NOTE — PROGRESS NOTES
[facility] [address] [address]  Dept: [phone]      Physical Therapy Daily Note     Referring MD: Camille Jolley MD  Diagnosis:     ICD-10-CM    1. It band syndrome, left  M76.32       2. Acute pain of left knee  M25.562          Therapy precautions:None  Co-morbidities affecting plan of care: PMH significant for right patellar tendinitis in high school    Total Timed Procedure Codes: 44 min, Total Time: 54 min  Time In: 01:00 PM  Time Out: 01:54 PM      PERTINENT MEDICAL HISTORY     Past medical and surgical history:   No past medical history on file. No past surgical history on file. Medications: reviewed in chart   Allergies: Not on File       Chief complaints/history of injury: Patient is a 24 y.o. male with a PMH complicated as noted above. He presents to PT with c/o left knee pain. Date symptoms began: ~3-4 weeks ago  Ignacio Bar of condition: Recent onset (initial onset within last 3 months)  Primary cause of current episode: Repetitive  How did symptoms start: Patient is a retired  who has transitioned into distance running. He is currently training for marathon and has run one in the past.  States about 3-4 weeks ago he was doing his long run and felt tightness in his lateral left knee that progressed to sharp burning pain at mile 7. States he continued to run and stopped at mile 11 to refill his water bottle. States he we restarted his run he felt stabbing pain and discontinued the run. States he rested for about 5 days however continued to struggle once he resumed running. States he bought new running shoes a specialty shoe store and ran a 5K without issues. Describe current symptoms: He ran a trial 1/2 marathon with intermittent moderate pain when going downhill, and he ran 6 miles 3 days ago and it felt \"okay\".   States he generally feels pain at around mile 3 and the pain lingers for about an hour after cessation of run. Reports no pain with general MRALDs. Received previous therapy? No    Diagnostic exams (per chart review): per 10/10/2022 MD note  X-ray LEFT knee 4 vw AP / lateral / Mike Hard / sunrise for knee pain     Findings: No soft tissue swelling. No effusion noted. No acute fracture or dislocation. No degenerative changes are present. Impression: Normal 4 view of knee. SUBJECTIVE     Pt reports he did a 6 mile run with tightness in his lateral knee afterwards. States his marathon is this Saturday and understands his post-run recovery routine will be important and he will experience some discomfort during the run. OBJECTIVE      Treatment provided today consisted of:    Dry Needling (un-timed code) 01785: needle insertion(s) without injection(s); 1-2 muscles:  Stimulating underlying myofascial trigger points, muscular and connective tissues for the management of neuromusculoskeletal pain and movement impairment   Patient was educated on dry needling treatment, expectations, and post-treatment instructions. Dry needling precautions/contraindications: Reviewed- none noted     Needles size and location: 60 mm  Right vastus lateralis right ITB                                             Needle count: 6 needles inserted/ 6 needles removed   Position: SDLY   Needle  technique left in situ x 5 minutes for each   Electrical Stimulation  none   Patient Response: Patient experienced no adverse response to treatment. Manual therapy (10909) x 5 min utilizing techniques to improve joint and/or soft tissue mobility, ROM, and function as well as helping to decrease pain/spasms and swelling. Palpation and assessment of soft tissue, muscles, and landmarks   STM distal R ITB and VL    Therapeutic activities (05118) x 39 min using dynamic activities to improve function related to distance running.   Elliptical x 6'  Jumping jacks with green TB around ankles x 50  Jumping jacks with green TB around ankles and yellow TB around wrists x 50  Forward step-ups into high knee on 18\" step, 2x15#, posterior resistance - 2x15  Lateral step-downs on 18\" step - 2x15  [employer] on sliders x 100        ASSESSMENT   Patient expressed muscular fatigue however no exacerbation of concordant symptoms during treatment today. Demonstration, tactile cuing, and verbal cuing provided to ensure proper performance of exercises. GOALS     Short term goals to be met by 10/26/2022 (2 weeks)  . - MET: 10/26/22    Pt. will be able to modify activities in order keep pain to minimal levels (? 4/10) with ADLs. - PROGRESSING: 10/26/22   Patient will achieve an average score on the Patient-Specific Functional Scale ? 5/10 indicating improving functional mobility.- MET: 10/26/22   Improve LEFS to ? 65/80 showing improved confidence in functional mobility.- Assess at next therapy session: 10/26/22     Long term goals to be met by 11/9/2022  (4 weeks)  Patient will report minimal to no discomfort while jogging. Improve LEFS to ? 70/80, which shows patient having decreased functional deficit related to knee injury. Patient will achieve an average score on the Patient-Specific Functional Scale ? 7/10 indicating improving functional mobility. Discharged from PT. PLAN    Re-eval    Access Code: [email or web address]  URL: [email or web address]  Date: 10/12/2022  Prepared by: Kari Mondragon.  DPT    Exercises  Prone Quadriceps Stretch with Strap - 2 x daily - 7 x weekly - 3 sets - 30 seconds hold  Hooklying Hamstring Stretch with Strap - 2 x daily - 7 x weekly - 3 sets - 30 seconds hold  Supine ITB Stretch with Strap - 2 x daily - 7 x weekly - 3 sets - 30 seconds hold  Hip Adductors and Hamstring Stretch with Strap - 2 x daily - 7 x weekly - 3 sets - 30 seconds hold  Hamstring Mobilization with Foam Roll - 2 x daily - 7 x weekly       [employer]

## 2022-11-10 NOTE — PROGRESS NOTES
[facility] [address] [address]  Dept: [phone]      Physical Therapy Daily Note     Referring MD: John Jackman MD  Diagnosis:     ICD-10-CM    1. It band syndrome, left  M76.32       2. Acute pain of left knee  M25.562          Therapy precautions:None  Co-morbidities affecting plan of care: PMH significant for right patellar tendinitis in high school    Total Timed Procedure Codes: 45 min, Total Time: 545 min        PERTINENT MEDICAL HISTORY     Past medical and surgical history:   No past medical history on file. No past surgical history on file. Medications: reviewed in chart   Allergies: Not on File       Chief complaints/history of injury: Patient is a 24 y.o. male with a PMH complicated as noted above. He presents to PT with c/o left knee pain. Date symptoms began: ~3-4 weeks ago  Terese Juares of condition: Recent onset (initial onset within last 3 months)  Primary cause of current episode: Repetitive  How did symptoms start: Patient is a retired  who has transitioned into distance running. He is currently training for marathon and has run one in the past.  States about 3-4 weeks ago he was doing his long run and felt tightness in his lateral left knee that progressed to sharp burning pain at mile 7. States he continued to run and stopped at mile 11 to refill his water bottle. States he we restarted his run he felt stabbing pain and discontinued the run. States he rested for about 5 days however continued to struggle once he resumed running. States he bought new running shoes a specialty shoe store and ran a 5K without issues. Describe current symptoms: He ran a trial 1/2 marathon with intermittent moderate pain when going downhill, and he ran 6 miles 3 days ago and it felt \"okay\". States he generally feels pain at around mile 3 and the pain lingers for about an hour after cessation of run.   Reports no pain with general MRALDs. Received previous therapy? No    Diagnostic exams (per chart review): per 10/10/2022 MD note  X-ray LEFT knee 4 vw AP / lateral / Naomy Suma / sunrise for knee pain     Findings: No soft tissue swelling. No effusion noted. No acute fracture or dislocation. No degenerative changes are present. Impression: Normal 4 view of knee. SUBJECTIVE     Pt says he has had no pain; he feels confident in training ability and has his race this weekend      OBJECTIVE        LEFS: 73/80    Treatment provided today consisted of:         Manual therapy (90177) x 10 min utilizing techniques to improve joint and/or soft tissue mobility, ROM, and function as well as helping to decrease pain/spasms and swelling. Palpation and assessment of soft tissue, muscles, and landmarks   Gr II hip mobs  PROM and MWM adeel hips    Therapeutic activities (14959) x 35 min using dynamic activities to improve function related to distance running. Elliptical x 6'  Lunge slider 3 way 2x6  Dynamic warm up 5'  SL RDL 20# 2x20  Plank w/ hip ABD 3x10    Patient Stated Goals:   Train effectively for marathon: 8/10  Run marathon: 7/10    ASSESSMENT   Pt met goals and demonstrates Ind w/ HEP. He has race this weekend and feels confident in ability to participate. We discussed possible f/u if symptoms worsen. GOALS     Short term goals to be met by 10/26/2022 (2 weeks)  . - MET: 10/26/22    Pt. will be able to modify activities in order keep pain to minimal levels (? 4/10) with ADLs. - PROGRESSING: 10/26/22   Patient will achieve an average score on the Patient-Specific Functional Scale ? 5/10 indicating improving functional mobility.- MET: 10/26/22   Improve LEFS to ? 65/80 showing improved confidence in functional mobility.- Assess at next therapy session: 10/26/22     Long term goals to be met by 11/9/2022  (4 weeks)  Patient will report minimal to no discomfort while jogging.  Goal Met 11/10/2022  Improve LEFS to ? 70/80, which shows patient having decreased functional deficit related to knee injury. Goal Met 11/10/2022  Patient will achieve an average score on the Patient-Specific Functional Scale ? 7/10 indicating improving functional mobility. Goal Met 11/10/2022  Discharged from PT. Goal Met 11/10/2022        PLAN    D/C    Access Code: [email or web address]  URL: [email or web address]  Date: 10/12/2022  Prepared by: Lucy Tang.  DPT    Exercises  Prone Quadriceps Stretch with Strap - 2 x daily - 7 x weekly - 3 sets - 30 seconds hold  Hooklying Hamstring Stretch with Strap - 2 x daily - 7 x weekly - 3 sets - 30 seconds hold  Supine ITB Stretch with Strap - 2 x daily - 7 x weekly - 3 sets - 30 seconds hold  Hip Adductors and Hamstring Stretch with Strap - 2 x daily - 7 x weekly - 3 sets - 30 seconds hold  Hamstring Mobilization with Foam Roll - 2 x daily - 7 x weekly       [employer]

## 2022-11-21 ENCOUNTER — OFFICE VISIT (OUTPATIENT)
Dept: ORTHOPEDIC SURGERY | Age: 27
End: 2022-11-21
Payer: COMMERCIAL

## 2022-11-21 DIAGNOSIS — M76.32 IT BAND SYNDROME, LEFT: Primary | ICD-10-CM

## 2022-11-21 PROCEDURE — 20550 NJX 1 TENDON SHEATH/LIGAMENT: CPT | Performed by: STUDENT IN AN ORGANIZED HEALTH CARE EDUCATION/TRAINING PROGRAM

## 2022-11-21 PROCEDURE — 99213 OFFICE O/P EST LOW 20 MIN: CPT | Performed by: STUDENT IN AN ORGANIZED HEALTH CARE EDUCATION/TRAINING PROGRAM

## 2022-11-21 RX ORDER — METHYLPREDNISOLONE ACETATE 40 MG/ML
40 INJECTION, SUSPENSION INTRA-ARTICULAR; INTRALESIONAL; INTRAMUSCULAR; SOFT TISSUE ONCE
Status: COMPLETED | OUTPATIENT
Start: 2022-11-21 | End: 2022-11-21

## 2022-11-21 RX ADMIN — METHYLPREDNISOLONE ACETATE 40 MG: 40 INJECTION, SUSPENSION INTRA-ARTICULAR; INTRALESIONAL; INTRAMUSCULAR; SOFT TISSUE at 13:34

## 2022-11-21 NOTE — PROGRESS NOTES
Name: Paris Brennan  YOB: 1995  Gender: male  MRN: [id number]  Date of Encounter:  11/21/2022       CHIEF COMPLAINT:     Chief Complaint   Patient presents with    Follow-up     Left knee        SUBJECTIVE/OBJECTIVE:      HPI:    Patient is a 24 y.o. pleasant male who presents today for a follow up evaluation of his left knee. Working diagnosis is: The encounter diagnosis was It band syndrome, left. LOV: 11/7/2022     He completed his marathon race and did well up until about mile 16 when he started to notice knee pain laterally. This progressed and got fairly significant around mile 25. He had a lot of pain the next 2 days after the run which persisted this week. It's gotten somewhat better but he even has pain walking. He wants to run again. He has rested this week from activity. He has not noticed swelling or change to his symptoms. He did have pain more at the back after the run, but now it's primarily the distal IT band. He had finished with PT prior to the race. PAST HISTORY:   Past medical, surgical, family, social history and allergies reviewed by me. Unchanged from prior visit. REVIEW OF SYSTEMS:   As noted in HPI. PHYSICAL EXAMINATION:     Gen: Well-developed, no acute distress   HEENT: NC/AT, EOMI   Neck: Trachea midline, normal ROM   CV: Regular rhythm by palpation of distal pulse, normal capillary refill   Pulm: No respiratory distress, no stridor   Psychiatric: Well oriented, normal mood and affect. Skin: No rashes, lesions or ulcers, normal temperature, turgor, and texture on uninvolved extremity. ORTHO EXAM:     Left knee:      Inspection: No edema, No ecchymosis  Palpation: Effusion  none  ROM: 140 flexion, 0 extension   Tenderness: distal IT band and Gerdys tubercle mild TTP   Provocative testing: mild pain with [place]  Strength: Extensor mechanism intact  Sensation: intact to light touch   Capillary refill normal    Gait: Normal      DIAGNOSTIC IMAGING:     I have reviewed prior imaging studies. DIAGNOSTIC ULTRASOUND OF LEFT KNEE    DATE OF STUDY: 11/21/22    INDICATION: left knee pain    FINDINGS:   Patellar and quadriceps tendon appear normal, no evidence of tendinosis or tenosynovitis. There is normal appearance to Hoffa fat pad. There is no joint effusion. Medial and lateral joint compartment appear normal with no bony changes. Medial and lateral meniscus without evidence of tear. Femoral condylar articular surface appears normal. Normal appearance of tibial tuberosity. The distal IT band near its insertion at [facility] tubercle fibers appear normal, but with small amount of bursa fluid. IMPRESSION: IT band bursitis    ASSESSMENT/PLAN:   1. It band syndrome, left         We discussed that pain flare is expected after this race, but he would like to continue running and this problem has not improved significantly despite PT. Given the small bursal distension on US, I advised we could trial CSI to the IT band bursa. He was informed of risk of tendon rupture and infection and wished to proceed. I advised active rest from running but continued PT activities. If he does not see significant improvement despite continued PT and injection over the next 2-3 weeks, I would consider MRI. Orders:   Orders Placed This Encounter   Procedures    INJECT TENDON SHEATH/LIGAMENT    US GUIDED NEEDLE PLACEMENT     Standing Status:   Future     Standing Expiration Date:   11/21/2023    US EXTREMITY JOINT LEFT NON VASC COMPLETE     Standing Status:   Future     Standing Expiration Date:   11/21/2023      Left Knee IT band bursa Injection - US guided      An injection of corticosteroid was discussed today and is indicated for patients pain and condition today. All risks and benefits were discussed and patient wishes to proceed. Prior to proceeding forward with a steroid injection to the left IT band bursa, proper informed consent was provided by the patient.  Risks discussed include infection, pain, bleeding, and damage to surrounding tissue. Time-out was conducted with all members of the care team.     The patient was placed in a right lateral decubitus position with the left knee slightly flexed to 40 degrees. An out-of-plane approach was used for injection. The site of the injection was then cleansed chlorhexidine. A sterile gel was then placed over the area. The ultrasound was used to identify the IT band bursa overlying the lateral femoral condyle. . Following this a vapo coolant spray was utilized to provide local skin anesthesia. A solution of 40mg Depo-medrol and 1cc 1% lidocaine was delivered through a 25 gauge, 2.5\" into the IT band bursa. . The site was again cleansed with an alcohol swab. The patient tolerated this procedure well with no adverse events. There was some notable pain relief reported by the patient prior to leaving the office today. The patient was counseled not to submerge the site for 24 hours, not to perform strenuous activity for the next five days, and if notes any signs or symptoms consistent with joint infection or allergic reaction to go to a local emergency room. The patient was observed for 15 minutes postprocedure and was allowed to be discharged from clinic in their usual state of health. Ultrasound use was deemed to be medically necessary to ensure appropriate placement of the injectate. Images were saved to PACS system. Follow Up:   Return in about 2 weeks (around 12/5/2022). The patient expressed understanding and agreed with the plan. Lori Odonnell MD   [facility]     This document was created using voice recognition software so frequent mistakes are possible. For any concerns about the wording of this document, please contact its creator for further clarification.

## 2022-12-20 ENCOUNTER — OFFICE VISIT (OUTPATIENT)
Dept: ORTHOPEDIC SURGERY | Age: 27
End: 2022-12-20
Payer: COMMERCIAL

## 2022-12-20 DIAGNOSIS — M76.32 IT BAND SYNDROME, LEFT: Primary | ICD-10-CM

## 2022-12-20 PROCEDURE — 99213 OFFICE O/P EST LOW 20 MIN: CPT | Performed by: STUDENT IN AN ORGANIZED HEALTH CARE EDUCATION/TRAINING PROGRAM

## 2022-12-20 NOTE — PROGRESS NOTES
Name: Chelle Almendarez  YOB: 1995  Gender: male  MRN: [id number]  Date of Encounter:  12/20/2022       CHIEF COMPLAINT:     Chief Complaint   Patient presents with    Follow-up     Left knee        SUBJECTIVE/OBJECTIVE:      HPI:    Patient is a 24 y.o. pleasant male who presents today for a return evaluation of his left knee. Working diagnosis:   1. It band syndrome, left       LOV: 11/21/22    Last visit we performed corticosteroid injection of the IT band bursa. He cannot really comment on whether it helped or did not help, because he has not been running since his marathon. He has been doing more biking and swimming. He did play in a soccer league game and it got somewhat sore, but not terrible. He does want to progress back to running. He has continued his therapy exercises at home. PAST HISTORY:   Past medical, surgical, family, social history and allergies reviewed by me. Unchanged from prior visit. REVIEW OF SYSTEMS:   As noted in HPI. PHYSICAL EXAMINATION:     Gen: Well-developed, no acute distress   HEENT: NC/AT, EOMI   Neck: Trachea midline, normal ROM   CV: Regular rhythm by palpation of distal pulse, normal capillary refill   Pulm: No respiratory distress, no stridor   Psychiatric: Well oriented, normal mood and affect. Skin: No rashes, lesions or ulcers, normal temperature, turgor, and texture on uninvolved extremity. ORTHO EXAM:    Left knee: Inspection: No erythema  Palpation: Effusion  none  ROM: 140 flexion, 0 extension   Tenderness: mild tenderness to distal IT band, but not at its insertion  Provocative testing: negative Reji  Strength: Extensor mechanism intact  Sensation: intact to light touch   Capillary refill normal    Gait: Normal     DIAGNOSTIC IMAGING:     I have reviewed prior imaging studies. ASSESSMENT/PLAN:   1. It band syndrome, left       I advised that we send him for gait analysis study in progression with Aj.   He is agreeable to this and would really be interested in performing this. I want him to be able to progress back to running without pain. We will reevaluate in around 2 months, but sooner if he has worsening pain. Orders / medications today: No orders of the defined types were placed in this encounter. Follow up: Return in about 2 months (around 2/20/2023). The patient expressed understanding and agreed with the plan. Wenceslao Krishna MD   [facility]     This document was created using voice recognition software so frequent mistakes are possible. For any concerns about the wording of this document, please contact its creator for further clarification.

## 2023-01-11 ENCOUNTER — HOSPITAL ENCOUNTER (OUTPATIENT)
Dept: PHYSICAL THERAPY | Age: 28
Setting detail: RECURRING SERIES
Discharge: HOME | End: 2023-01-14
Payer: COMMERCIAL

## 2023-01-11 PROCEDURE — 97110 THERAPEUTIC EXERCISES: CPT

## 2023-01-11 PROCEDURE — 97161 PT EVAL LOW COMPLEX 20 MIN: CPT

## 2023-01-11 NOTE — PLAN OF CARE
Blessing Evans  : 1995  Primary: Madolyn Face (Commercial)  Secondary:  [facility]  [address]  [facility] [address]  Phone: [phone]  Fax: [phone]         PT Visit Info:         Visit Count:  Visit count could not be calculated. Make sure you are using a visit which is associated with an episode. OUTPATIENT PHYSICAL THERAPY:             OP NOTE TYPE: Initial Assessment 2023               Episode  Appt Desk         Treatment Diagnosis:  {Rehab Dx KYOL}  Medical/Referring Diagnosis:  No admission diagnoses are documented for this encounter. Referring Physician:  None, None  MD Orders:  PT Eval and Treat ***  Return MD Appt:  ***  Date of Onset:       Allergies:  Patient has no allergy information on record. Restrictions/Precautions:           Medications Last Reviewed:  2023     SUBJECTIVE   History of Injury/Illness (Reason for Referral):  Last year he ran 2 marathons. During one training run he started to develop lateral knee pain that progressively became worse. He saw Dr. Christiano Bobby and PT. The pain improved but still there at about mile 16. Patient Stated Goal(s):  \"***\"  Initial:      /10 Post Session:      /10  Past Medical History/Comorbidities:   Mr. Kathy Hussein  has no past medical history on file. Mr. Kathy Hussein  has no past surgical history on file. Broke R ankle twice (treated w/ a boot both times), broken R ASIS  Social History/Living Environment:         Prior Level of Function/Work/Activity:               Learning:         Fall Risk Scale:         {Additional Subjective Info (Optional):24426}      OBJECTIVE   {PT/OT Objective:85711}  ASSESSMENT   Initial Assessment:  ***  Problem List: (Impacting functional limitations):           Therapy Prognosis:         Initial Assessment Complexity:      PLAN   Effective Dates: *** TO     Frequency/Duration:     Interventions Planned (Treatment may consist of any combination of the following): Goals: (Goals have been discussed and agreed upon with patient.)  Short-Term Functional Goals: Time Frame: ***  ***  Discharge Goals: Time Frame: ***  ***         Outcome Measure:   {OUTCOME MEASURES:21469}    Medical Necessity:   {PT Medical Necessity:13580::\"> Skilled intervention continues to be required due to ***. \"}  Reason For Services/Other Comments:  {PT Continuation:31586::\"> Patient continues to require skilled intervention due to ***. \"}  Total Duration:       Regarding Fredick Garrison therapy, I certify that the treatment plan above will be carried out by a therapist or under their direction.   Thank you for this referral,  Corona French, PT     Referring Physician Signature: None, None {MD Signature Line:07530}        Post Session Pain  Charge Capture  PT Visit Info MD Guidelines  Maribel

## 2023-01-11 NOTE — PROGRESS NOTES
Milton Khalil  : 1995  Primary: Agnes Severance (Commercial)  Secondary:  [facility]  [address] [place] [address]  Phone: [phone]  Fax: [phone] No data recorded  No data recorded    PT Visit Info:       Visit Count:  1    OUTPATIENT PHYSICAL THERAPY:OP NOTE TYPE: Treatment Note 2023       Episode  }Appt Desk             Treatment Diagnosis:  {Rehab Dx Codes:36475}  Medical/Referring Diagnosis:  No admission diagnoses are documented for this encounter. Referring Physician:  None, None  MD Orders:  PT Eval and Treat ***  Date of Onset:  No data recorded   Allergies:   Patient has no allergy information on record. Restrictions/Precautions:  No data recorded  No data recorded   Interventions Planned (Treatment may consist of any combination of the following):    No data recorded     Subjective Comments:     Initial:}     /10Post Session:        /10  Medications Last Reviewed:  2023  Updated Objective Findings:  {New Findings:69669}  Treatment   {TREATMENTSOPPT:85666}  {Grids/Tables:19338}    Treatment/Session Summary:    Treatment Assessment:     Communication/Consultation:  {Communication:79127}  Equipment provided today:  {None/Wildcard:95352}  Recommendations/Intent for next treatment session: Next visit will focus on ***.     Total Treatment Billable Duration:  *** minutes  Time In: 15  Time Out: [address] [facility], PT       Charge Capture  }Post Session Pain  PT Visit Info  [facility] Portal  MD Guidelines  Scanned Media  Benefits  MyChart    Future Appointments   Date Time Provider Vishal Dhaliwal   2023  9:00 AM Cathy Botello MD [facility]

## 2023-01-25 ASSESSMENT — PAIN SCALES - GENERAL: PAINLEVEL_OUTOF10: 2

## 2023-02-20 ENCOUNTER — OFFICE VISIT (OUTPATIENT)
Dept: ORTHOPEDIC SURGERY | Age: 28
End: 2023-02-20
Payer: COMMERCIAL

## 2023-02-20 DIAGNOSIS — M76.32 IT BAND SYNDROME, LEFT: Primary | ICD-10-CM

## 2023-02-20 PROCEDURE — 99212 OFFICE O/P EST SF 10 MIN: CPT | Performed by: STUDENT IN AN ORGANIZED HEALTH CARE EDUCATION/TRAINING PROGRAM

## 2023-02-20 NOTE — PROGRESS NOTES
Name: Marc Nieto  YOB: 1995  Gender: male  MRN: [id number]  Date of Encounter:  2/20/2023       CHIEF COMPLAINT:     Chief Complaint   Patient presents with    Follow-up     Left knee        SUBJECTIVE/OBJECTIVE:      HPI:    Patient is a 24 y.o. pleasant male who presents today for a return evaluation of his left knee. Working diagnosis:   1. It band syndrome, left       LOV: 12/20/22    His knee pain is significantly better. He did have a visit with Tammy Mckinney who did a gait analysis and gave him tips on different therapy exercises, and he has been making some mild modifications with his run. He has had no pain, and has gotten up to about 8 miles in terms of running. He had a little bit of pain with a soccer game recently, but otherwise no issues. He has hopes to do a marathon later this year. PAST HISTORY:   Past medical, surgical, family, social history and allergies reviewed by me. Unchanged from prior visit. REVIEW OF SYSTEMS:   As noted in HPI. PHYSICAL EXAMINATION:     Gen: Well-developed, no acute distress   HEENT: NC/AT, EOMI   Neck: Trachea midline, normal ROM   CV: Regular rhythm by palpation of distal pulse, normal capillary refill   Pulm: No respiratory distress, no stridor   Psychiatric: Well oriented, normal mood and affect. Skin: No rashes, lesions or ulcers, normal temperature, turgor, and texture on uninvolved extremity. ORTHO EXAM:    Left knee exam:     No effusion, no ecchymosis  Full flexion 140, 0 extension  Mild tenderness to distal IT band. No other tenderness. Normal capillary refill and sensation     DIAGNOSTIC IMAGING:     I have reviewed prior imaging studies. ASSESSMENT/PLAN:   1. It band syndrome, left         He should continue to his progress his running as his pain tolerates, keeping in mind his modifications, and advised to continue therapy exercises.   I advised him to slowly progress her running mileage in intensity. Advised to call us as needed if pain is worsening. Orders / medications today: No orders of the defined types were placed in this encounter. Follow up: Return if symptoms worsen or fail to improve. The patient expressed understanding and agreed with the plan. Lynn Beltre MD   [facility]     This document was created using voice recognition software so frequent mistakes are possible. For any concerns about the wording of this document, please contact its creator for further clarification.